# Patient Record
Sex: FEMALE | URBAN - METROPOLITAN AREA
[De-identification: names, ages, dates, MRNs, and addresses within clinical notes are randomized per-mention and may not be internally consistent; named-entity substitution may affect disease eponyms.]

---

## 2018-03-23 ENCOUNTER — TELEPHONE (OUTPATIENT)
Dept: ONCOLOGY | Facility: CLINIC | Age: 63
End: 2018-03-23

## 2018-03-23 NOTE — TELEPHONE ENCOUNTER
I attempted to call patient and LVM requesting a return call. New patient scheduling has not been able to obtain records and have attempted to call patient to see that MAHI is signed. We have no records at this time. Will wait for a return call. Huma Dent